# Patient Record
Sex: FEMALE | Race: WHITE
[De-identification: names, ages, dates, MRNs, and addresses within clinical notes are randomized per-mention and may not be internally consistent; named-entity substitution may affect disease eponyms.]

---

## 2020-12-08 NOTE — NUR
PT ARRIVED TO DAY SURGERY RM 9. PT CONFIRMED THAT SHE DID NOT DO THE PRE-OP
COVID SCREEN. PROCEDURE CANCELLED PER PROTOCOL AND
PT INSTRUCTED TO RESCHEDULE WITH DR. GOULD OFFICE.

## 2021-01-19 ENCOUNTER — HOSPITAL ENCOUNTER (OUTPATIENT)
Dept: HOSPITAL 46 - DS | Age: 76
Discharge: HOME | End: 2021-01-19
Attending: SURGERY
Payer: MEDICARE

## 2021-01-19 VITALS — HEIGHT: 63 IN | BODY MASS INDEX: 30.78 KG/M2 | WEIGHT: 173.72 LBS

## 2021-01-19 DIAGNOSIS — K22.9: ICD-10-CM

## 2021-01-19 DIAGNOSIS — K44.9: ICD-10-CM

## 2021-01-19 DIAGNOSIS — F41.8: ICD-10-CM

## 2021-01-19 DIAGNOSIS — K29.50: Primary | ICD-10-CM

## 2021-01-19 DIAGNOSIS — K58.0: ICD-10-CM

## 2021-01-19 DIAGNOSIS — K21.9: ICD-10-CM

## 2021-01-19 DIAGNOSIS — M25.552: ICD-10-CM

## 2021-01-19 DIAGNOSIS — M25.561: ICD-10-CM

## 2021-01-19 DIAGNOSIS — G25.81: ICD-10-CM

## 2021-01-19 DIAGNOSIS — Z83.79: ICD-10-CM

## 2021-01-19 DIAGNOSIS — Z98.890: ICD-10-CM

## 2021-01-19 PROCEDURE — 0DB68ZX EXCISION OF STOMACH, VIA NATURAL OR ARTIFICIAL OPENING ENDOSCOPIC, DIAGNOSTIC: ICD-10-PCS | Performed by: SURGERY

## 2021-01-19 PROCEDURE — G0500 MOD SEDAT ENDO SERVICE >5YRS: HCPCS

## 2021-01-19 PROCEDURE — 0DB98ZX EXCISION OF DUODENUM, VIA NATURAL OR ARTIFICIAL OPENING ENDOSCOPIC, DIAGNOSTIC: ICD-10-PCS | Performed by: SURGERY

## 2021-01-19 NOTE — OR
[facility]
                                    [address], [place]  [address]
_________________________________________________________________________________________
                                                                 Signed   
 
 
DATE OF OPERATION:
01/19/2021
 
SURGEON:
Hudson Garcia MD
 
PREOPERATIVE DIAGNOSES:
1. Left upper quadrant abdominal pain.
2. Irritable bowel syndrome with diarrhea.
3. Laparoscopic Nissen fundoplication in 2015.
4. Recurrent hiatal hernia.
5. History of Crohn disease in her son.
 
POSTOPERATIVE DIAGNOSES:
1. Nissen fundoplication (40-38 cm).
2. Mild patchy gastritis.
3. Patulous esophagus.
 
PROCEDURES:
EGD with CLOtest and biopsies of the duodenum, pyloric bulb and antrum.
 
ESTIMATED BLOOD LOSS:
None.
 
INDICATIONS:
Zaki is a 75-year-old female asked to see me for upper endoscopy.  She has had
irritable bowel syndrome with diarrhea for many years.  She had a lap Nissen in 2015
while living over in [place], [place].  Recently they moved to the [facility] to be
closer to family.  She has been having left upper quadrant abdominal pain.  She said the
acid reflux is markedly improved.  A CT scan of the abdomen and pelvis for microscopic
hematuria showed a recurrence of her hiatal hernia.  Consequently, she was asked to see
me with respect to the above.  I had explained that it is quite common that part of the
fundoplication herniates.  As long as it is functioning and asymptomatic, it is best to
left alone.  We also reviewed upper endoscopy in detail.  She understands the nature of
the test along with its risks including, but not limited to gas bloating, crampy
abdominal pain, bleeding, perforation requiring surgery, and missed diagnosis.  She also
recalls the need for IV conscious sedation.  She had expressed understanding and wished
to proceed.  We also sent her for a repeat barium swallow.  She has a __________ with an
obvious patulous esophagus in her Nissen fundoplication.  She does have some recurrence
to the hiatal hernia.  Zaki had expressed understanding and wished to proceed with
her upper endoscopy. 
 
 
    Electronically Signed By: HUDSON GARCIA MD  01/19/21 0958
_________________________________________________________________________________________
PATIENT NAME:     ZAKI STAUFFER                   
MEDICAL RECORD #: [id number]            OPERATIVE REPORT              
          ACCT #: [id number]  
DATE OF BIRTH:   [birth year]            REPORT #: [id number]      
PHYSICIAN:        HUDSON GARCIA MD             
PCP:              SONYA LAGUNA MD           
REPORT IS CONFIDENTIAL AND NOT TO BE RELEASED WITHOUT AUTHORIZATION
 
 
                                  [facility]
                                    [address] [place] [address]  [address]
_________________________________________________________________________________________
                                                                 Signed   
 
 
PROCEDURE NOTE:
Zaki was taken into our endoscopy suite and placed in the supine semi-recumbent
position.  She was given IV sedation with 2 mg of Versed and 50 mcg of fentanyl.  She is
bradycardic in the 40s and so her circulation time was a bit slow.  The posterior
oropharynx was anesthetized with Hurricaine spray.  A bite block was utilized for the
case.  The adult gastroscope had been introduced and advanced under direct visualization
of camera.  We could see she had a patulous esophagus with saliva in her distal
esophagus.  We went through the fundoplication all the way out into the third portion of
the duodenum.  We took a biopsy of the duodenum because of history of diarrhea.
However, the duodenum and pyloric channel were unremarkable.  The stomach showed some
mild patchy erythematous changes throughout.  We went and took a biopsy of the antrum
for CLOtest as well as pathologic review.  Upon retroflexion of scope, we can easily see
her intact fundoplication.  The scope was withdrawn up through the area of the
fundoplication.  It measured out from 40 cm back to about 37 cm.  The GE junction is
unremarkable.  There is no disruption to the Z-line.  No Yin's mucosa, no distal
esophagitis.  Again, she has a patulous esophagus with pooling of saliva.  However, no
irritation.  The middle and upper esophagus were unremarkable.  No obvious areas of
narrowing with the scope.  The vocal cords and arytenoids were unremarkable.  The gas
had been suctioned out and the gastroscope removed.  Zaki tolerated the procedure
quite well. 
 
RECOMMENDATIONS:
I will see Zaki back in my office in 7 to 14 days to review her barium swallow in the
upper endoscopy. 
 
 
 
            ________________________________________
            Hudson Garcia MD 
 
 
ALB/MODL
Job #:  [id number]
DD:  01/19/2021 08:39:13
DT:  01/19/2021 08:54:19
 
cc:            MD Hudson Awad MD
 
 
Copies:  SONYA LAGUNA DMD
 
    Electronically Signed By: HUDSON GARCIA MD  01/19/21 0958
_________________________________________________________________________________________
PATIENT NAME:     ZAKI STAUFFER                   
MEDICAL RECORD #: [id number]            OPERATIVE REPORT              
          ACCT #: [id number]  
DATE OF BIRTH:   [birth year]            REPORT #: [id number]      
PHYSICIAN:        HUDSON GARCIA MD             
PCP:              SONYA LAGUNA MD           
REPORT IS CONFIDENTIAL AND NOT TO BE RELEASED WITHOUT AUTHORIZATION
 
 
                                  [address]  [address]
_________________________________________________________________________________________
                                                                 Signed   
 
 
         HUDSON GARCIA MD
~
 
 
 
 
 
 
 
 
 
 
 
 
 
 
 
 
 
 
 
 
 
 
 
 
 
 
 
 
 
 
 
 
 
 
 
 
 
 
 
 
 
    Electronically Signed By: HUDSON GARCIA MD  01/19/21 0958
_________________________________________________________________________________________
PATIENT NAME:     ZAKI STAUFFER                   
MEDICAL RECORD #: [id number]            OPERATIVE REPORT              
          ACCT #: [id number]  
DATE OF BIRTH:   [birth year]            REPORT #: [id number]      
PHYSICIAN:        HUDSON GARCIA MD             
PCP:              SONYA LAGUNA MD           
REPORT IS CONFIDENTIAL AND NOT TO BE RELEASED WITHOUT AUTHORIZATION

## 2021-01-20 NOTE — PATH
[facility]
                                    [address] [place], [place]  [address]
_________________________________________________________________________________________
                                                                 Signed   
 
 
 
SPECIMEN(S): A DUODENAL BIOPSY
SPECIMEN(S): B ANTRUM/PYLORUS BIOPSY
 
SPECIMEN SOURCE:
A. DUODENAL BIOPSY
B. ANTRUM/PYLORUS BIOPSY
 
CLINICAL HISTORY:
Left upper quadrant pain.  History of hiatal hernia.  History of diarrhea.   
Gastritis. 
MICROSCOPIC DESCRIPTION:
Histologic sections of all submitted blocks are examined by light microscopy. 
These findings, together with the gross examination, support the pathologic 
diagnosis. 
B1 - An immunostain for Helicobacter pylori is performed with appropriate 
controls to evaluate for the presence of microorganisms.  The stain is negative 
for organisms. 
 
FINAL PATHOLOGIC DIAGNOSIS:
A.  Duodenal biopsy:
-  Benign duodenal mucosa, negative for specific diagnostic abnormality.
B.  Antrum/pylorus, biopsy:
-  Mild chronic gastritis.
-  Helicobacter pylori immunostain is negative for organisms.
JVR:cml:C2NR
 
GROSS DESCRIPTION:
Two specimens are received in two containers, labeled "NO."
A.  The specimen, labeled "NO, duodenum biopsy," is received in formalin and 
consists of one tan soft tissue fragment that measures 0.3 cm in greatest 
dimension. The specimen is entirely submitted in 
cassette (A1).
B.  The specimen, labeled "NO, antrum biopsy," is received in formalin and 
consists of one tan soft tissue fragment that measures 0.3 cm in greatest 
dimension. The specimen is entirely submitted in 
cassette (B1).
JS (under the direct supervision of a pathologist)
The Gross Description was prepared using a voice recognition system. The report 
was reviewed for accuracy; however, sound-alike word errors, addition and/or 
deletions may occur. If there is any 
question about this report, please contact Client Services.
 
                                                                                    
_________________________________________________________________________________________
PATIENT NAME:     ZAKI STAUFFER                   
MEDICAL RECORD #: [id number]            PATHOLOGY                     
          ACCT #: [id number]       ACCESSION #: [id number]     
DATE OF BIRTH:   [birth year]            REPORT #: [id number]       
PHYSICIAN:        [facility] PATHOLOGY              
PCP:              SONYA LAGUNA MD           
REPORT IS CONFIDENTIAL AND NOT TO BE RELEASED WITHOUT AUTHORIZATION
 
 
                                  [facility]
                                    [address] [place], [place]  [address]
_________________________________________________________________________________________
                                                                 Signed   
 
 
 
PERFORMING LABORATORY:
The technical component was performed by [facility], [address] [place] [address] (Medical Director: Yessi Sinclair MD; CLIA# [id number]). 
Professional interpretation [place] [address]. 
 
Diagnostician:  Rusty Samaniego MD
Pathologist
Electronically Signed 01/20/2021
 
 
Copies:                                
~
 
 
 
 
 
 
 
 
 
 
 
 
 
 
 
 
 
 
 
 
 
 
 
 
 
 
 
 
                                                                                    
_________________________________________________________________________________________
PATIENT NAME:     ZAKI STAUFFER                   
MEDICAL RECORD #: [id number]            PATHOLOGY                     
          ACCT #: [id number]       ACCESSION #: [id number]     
DATE OF BIRTH:   [birth year]            REPORT #: [id number]       
PHYSICIAN:        [facility]              
PCP:              SONYA LAGUNA MD           
REPORT IS CONFIDENTIAL AND NOT TO BE RELEASED WITHOUT AUTHORIZATION

## 2021-11-26 ENCOUNTER — HOSPITAL ENCOUNTER (EMERGENCY)
Dept: HOSPITAL 46 - ED | Age: 76
Discharge: HOME | End: 2021-11-26
Payer: MEDICARE

## 2021-11-26 VITALS — HEIGHT: 63 IN | WEIGHT: 173 LBS | BODY MASS INDEX: 30.65 KG/M2

## 2021-11-26 DIAGNOSIS — Z87.891: ICD-10-CM

## 2021-11-26 DIAGNOSIS — R07.89: Primary | ICD-10-CM

## 2021-11-26 DIAGNOSIS — Z88.0: ICD-10-CM

## 2021-11-26 DIAGNOSIS — Z79.899: ICD-10-CM

## 2021-11-27 NOTE — EKG
[facility] [place] [facility]
                                    [address] [facility]
                                  [place], [place]  [address]
_________________________________________________________________________________________
                                                                 Signed   
 
 
Sinus bradycardia
Otherwise normal ECG
No previous ECGs available
Confirmed by RUSS FERRERA DO ([id number]) on 11/27/2021 6:51:35 PM
 
 
 
 
 
 
 
 
 
 
 
 
 
 
 
 
 
 
 
 
 
 
 
 
 
 
 
 
 
 
 
 
 
 
 
 
 
 
 
 
 
    Electronically Signed By: RUSS FERRERA DO  11/27/21 1851
_________________________________________________________________________________________
PATIENT NAME:     ZAKI STAUFFER                   
MEDICAL RECORD #: [id number]                     Electrocardiogram             
          ACCT #: [id number]  
DATE OF BIRTH:   [birth year]                                       
PHYSICIAN:   RUSS FERRERA DO                     REPORT #: [id number]
REPORT IS CONFIDENTIAL AND NOT TO BE RELEASED WITHOUT AUTHORIZATION

## 2022-10-17 ENCOUNTER — HOSPITAL ENCOUNTER (EMERGENCY)
Dept: HOSPITAL 46 - ED | Age: 77
Discharge: HOME | End: 2022-10-17
Payer: MEDICARE

## 2022-10-17 VITALS — BODY MASS INDEX: 31.71 KG/M2 | HEIGHT: 63 IN | WEIGHT: 178.99 LBS

## 2022-10-17 DIAGNOSIS — Z87.891: ICD-10-CM

## 2022-10-17 DIAGNOSIS — K52.9: Primary | ICD-10-CM

## 2022-10-17 DIAGNOSIS — Z79.899: ICD-10-CM

## 2022-10-17 DIAGNOSIS — Z88.0: ICD-10-CM

## 2023-07-20 VITALS — DIASTOLIC BLOOD PRESSURE: 77 MMHG | SYSTOLIC BLOOD PRESSURE: 139 MMHG

## 2023-07-31 ENCOUNTER — HOSPITAL ENCOUNTER (OUTPATIENT)
Dept: HOSPITAL 46 - DS | Age: 78
LOS: 2 days | Discharge: HOME | End: 2023-08-02
Attending: SPECIALIST
Payer: MEDICARE

## 2023-07-31 VITALS — WEIGHT: 175.36 LBS | BODY MASS INDEX: 31.07 KG/M2 | HEIGHT: 63 IN

## 2023-07-31 VITALS — DIASTOLIC BLOOD PRESSURE: 55 MMHG | SYSTOLIC BLOOD PRESSURE: 108 MMHG

## 2023-07-31 VITALS — DIASTOLIC BLOOD PRESSURE: 61 MMHG | SYSTOLIC BLOOD PRESSURE: 91 MMHG

## 2023-07-31 VITALS — DIASTOLIC BLOOD PRESSURE: 51 MMHG | SYSTOLIC BLOOD PRESSURE: 90 MMHG

## 2023-07-31 VITALS — SYSTOLIC BLOOD PRESSURE: 98 MMHG | DIASTOLIC BLOOD PRESSURE: 56 MMHG

## 2023-07-31 VITALS — SYSTOLIC BLOOD PRESSURE: 105 MMHG | DIASTOLIC BLOOD PRESSURE: 62 MMHG

## 2023-07-31 VITALS — SYSTOLIC BLOOD PRESSURE: 100 MMHG | DIASTOLIC BLOOD PRESSURE: 55 MMHG

## 2023-07-31 VITALS — SYSTOLIC BLOOD PRESSURE: 92 MMHG | DIASTOLIC BLOOD PRESSURE: 50 MMHG

## 2023-07-31 VITALS — SYSTOLIC BLOOD PRESSURE: 128 MMHG | DIASTOLIC BLOOD PRESSURE: 66 MMHG

## 2023-07-31 VITALS — SYSTOLIC BLOOD PRESSURE: 91 MMHG | DIASTOLIC BLOOD PRESSURE: 59 MMHG

## 2023-07-31 VITALS — SYSTOLIC BLOOD PRESSURE: 108 MMHG | DIASTOLIC BLOOD PRESSURE: 58 MMHG

## 2023-07-31 DIAGNOSIS — Z88.0: ICD-10-CM

## 2023-07-31 DIAGNOSIS — M16.12: Primary | ICD-10-CM

## 2023-07-31 PROCEDURE — C1776 JOINT DEVICE (IMPLANTABLE): HCPCS

## 2023-07-31 PROCEDURE — A9270 NON-COVERED ITEM OR SERVICE: HCPCS

## 2023-07-31 PROCEDURE — 0SRB0JZ REPLACEMENT OF LEFT HIP JOINT WITH SYNTHETIC SUBSTITUTE, OPEN APPROACH: ICD-10-PCS | Performed by: SPECIALIST

## 2023-07-31 PROCEDURE — C1713 ANCHOR/SCREW BN/BN,TIS/BN: HCPCS

## 2023-07-31 NOTE — OR
[facility]
                                    [address] [facility], [place]  [address]
_________________________________________________________________________________________
                                                                 Signed   
 
 
DATE OF OPERATION:
07/31/2023
 
SURGEON:
Barb Aviles MD
 
PREOPERATIVE DIAGNOSIS:
Left hip DJD, severe.
 
POSTOPERATIVE DIAGNOSIS:
Left hip DJD, severe.
 
PROCEDURE PERFORMED:
Left total hip arthroplasty with Jean Claude.
 
ASSISTANT:
Lanny Osorio PA-C.  Lanny was present and critical for all portions of
procedure. 
 
ANESTHESIA:
Spinal.
 
BLOOD LOSS:
175 mL.
 
IMPLANTS:
Larissa size 2 high offset stem, 46 mm cup with 2 screws and +0 head.
 
BRIEF HISTORY:
Zaki is an 78-year-old female with progressive worsening of severe osteoarthritis in
the left hip.  Risks and benefits of operative treatment were discussed with her and she
elected to proceed. 
 
DESCRIPTION OF OPERATION:
Once consent was obtained, she was taken to the operating room. After adequate
anesthesia, she was placed in the operating bed on the right lateral decubitus position.
Axillary roll was placed and the leg was prepped and draped in a standard sterile
fashion.  All downside pressure points were well padded.  The pins for the computer
array were then placed in the iliac crest three fingerbreadths posterior to the ASIS.
The hip was then approached through standard anterolateral approach with a longitudinal
skin incision taken through the skin and subcutaneous tissue.  The IT band was divided
longitudinally just posterior to its anterior edge.  The vastus lateralis was then
 
    Electronically Signed By: BARB AVILES MD  07/31/23 1339
_________________________________________________________________________________________
PATIENT NAME:     ZAKI STAUFFER                   
MEDICAL RECORD #: [id number]            OPERATIVE REPORT              
          ACCT #: [id number]  
DATE OF BIRTH:   [birth year]            REPORT #: [id number]      
PHYSICIAN:        BARB AVILES MD              
PCP:              SONYA LAGUNA MD           
REPORT IS CONFIDENTIAL AND NOT TO BE RELEASED WITHOUT AUTHORIZATION
 
 
                                  [facility]
                                    [address] [place], [place]  [address]
_________________________________________________________________________________________
                                                                 Signed   
 
 
divided from the tip of the trochanter distally and subperiosteally elevated.  The
gluteus medius was divided just along its anterior margin from the tip of the trochanter
to the acetabular rim.  This was taken down to the capsule and femoral neck, which was
then elevated off the anterior femoral neck all the way around as far as we could reach.
 The capsule was released and a posterior partial capsulectomy was performed.  The
posterior superior osteophyte off the acetabulum was removed to allow better chance of
dislocation.  Once this was accomplished, the leg was registered with the computer and
the hip was dislocated.  The femoral neck cut was made one fingerbreadth above the
lesser trochanter and the head was passed off the table.  The patient wanted to save it.
 Once this was completed, the periacetabular soft tissue was removed and the remnants of
the bone are removed.  The fine anatomic points of the acetabulum were registered with
the computer.  Once this was accomplished, the robot was brought in.  The acetabulum was
reamed with a 44 first followed by 46.  No cysts were identified.  The osteophytes were
trimmed back posteriorly.  The cup was then impacted in 23 degrees of anteversion and 44
degrees of abduction.  Once this was completed, the cup was secured with two 6.5 screws
posterior superior.  The acetabular liner was then impacted until it was well seated and
locked.  Attention was then turned to the proximal femur which was opened using the
cookie cutter followed by the Maryam awl.  We then sequentially broached up to a 2,
which was found to be well fitting and left in position.  The high offset neck and a +0
head was positioned.  The hip was reduced.  The leg lengths were found to be equal with
excellent range of motion and a negative Shuck test.  The hip was dislocated and the
trials were removed.  The final stem was impacted until it was well seated and flushed
with the cup.  The +0  32 mm head was impacted and the hip was reduced and again leg
lengths found to be good.  The hip was stable throughout the range of motion.  We then
copiously irrigated the entire wound with Surgiphor followed by normal saline.  The
capsulotomy was then closed using #1 Vicryl. The vastus and IT bands were closed
independently using #2 Stratafix, the subcutaneous tissue with 0 Stratafix and the skin
with 3-0 Stratafix.  LiquiBand was applied and the wound was dressed with an Aquacel Ag
dressing.  The patient was then awakened, taken to the recovery room in satisfactory
condition.  All sponge, needle, and instrument counts were correct. 
 
 
 
            ________________________________________
            Barb Aviles MD 
 
 
BA/MODL
Job #:  [id number]
DD:  07/31/2023 09:10:41
DT:  07/31/2023 10:37:19
 
 
    Electronically Signed By: BARB AVILES MD  07/31/23 1339
_________________________________________________________________________________________
PATIENT NAME:     ZAKI STAUFFER                   
MEDICAL RECORD #: [id number]            OPERATIVE REPORT              
          ACCT #: [id number]  
DATE OF BIRTH:   [birth year]            REPORT #: [id number]      
PHYSICIAN:        BARB AVILES MD              
PCP:              SONYA LAGUNA MD           
REPORT IS CONFIDENTIAL AND NOT TO BE RELEASED WITHOUT AUTHORIZATION
 
 
                                  [address]  [address]
_________________________________________________________________________________________
                                                                 Signed   
 
 
Copies:                                
~
 
 
 
 
 
 
 
 
 
 
 
 
 
 
 
 
 
 
 
 
 
 
 
 
 
 
 
 
 
 
 
 
 
 
 
 
 
 
 
 
 
    Electronically Signed By: BARB AVILES MD  07/31/23 1339
_________________________________________________________________________________________
PATIENT NAME:     ZAKI STAUFFER                   
MEDICAL RECORD #: [id number]            OPERATIVE REPORT              
          ACCT #: [id number]  
DATE OF BIRTH:   [birth year]            REPORT #: [id number]      
PHYSICIAN:        BARB AVILES MD              
PCP:              SONYA LAGUNA MD           
REPORT IS CONFIDENTIAL AND NOT TO BE RELEASED WITHOUT AUTHORIZATION

## 2023-08-01 VITALS — SYSTOLIC BLOOD PRESSURE: 102 MMHG | DIASTOLIC BLOOD PRESSURE: 58 MMHG

## 2023-08-01 VITALS — SYSTOLIC BLOOD PRESSURE: 80 MMHG | DIASTOLIC BLOOD PRESSURE: 52 MMHG

## 2023-08-01 VITALS — DIASTOLIC BLOOD PRESSURE: 84 MMHG | SYSTOLIC BLOOD PRESSURE: 109 MMHG

## 2023-08-01 VITALS — SYSTOLIC BLOOD PRESSURE: 90 MMHG | DIASTOLIC BLOOD PRESSURE: 58 MMHG

## 2023-08-01 VITALS — DIASTOLIC BLOOD PRESSURE: 61 MMHG | SYSTOLIC BLOOD PRESSURE: 120 MMHG

## 2023-08-01 VITALS — SYSTOLIC BLOOD PRESSURE: 88 MMHG | DIASTOLIC BLOOD PRESSURE: 60 MMHG

## 2023-08-01 VITALS — SYSTOLIC BLOOD PRESSURE: 92 MMHG | DIASTOLIC BLOOD PRESSURE: 48 MMHG

## 2023-08-02 VITALS — DIASTOLIC BLOOD PRESSURE: 56 MMHG | SYSTOLIC BLOOD PRESSURE: 121 MMHG

## 2023-08-02 VITALS — DIASTOLIC BLOOD PRESSURE: 57 MMHG | SYSTOLIC BLOOD PRESSURE: 113 MMHG

## 2023-08-07 ENCOUNTER — HOSPITAL ENCOUNTER (INPATIENT)
Dept: HOSPITAL 46 - ED | Age: 78
LOS: 4 days | Discharge: NURSING, REHAB OR LONG-TERM CARE | DRG: 556 | End: 2023-08-11
Admitting: SPECIALIST
Payer: MEDICARE

## 2023-08-07 DIAGNOSIS — Z79.82: ICD-10-CM

## 2023-08-07 DIAGNOSIS — Z79.899: ICD-10-CM

## 2023-08-07 DIAGNOSIS — K59.00: ICD-10-CM

## 2023-08-07 DIAGNOSIS — Z90.49: ICD-10-CM

## 2023-08-07 DIAGNOSIS — R33.9: ICD-10-CM

## 2023-08-07 DIAGNOSIS — Z96.642: ICD-10-CM

## 2023-08-07 DIAGNOSIS — Z87.891: ICD-10-CM

## 2023-08-07 DIAGNOSIS — K52.9: ICD-10-CM

## 2023-08-07 DIAGNOSIS — I25.10: ICD-10-CM

## 2023-08-07 DIAGNOSIS — G25.81: ICD-10-CM

## 2023-08-07 DIAGNOSIS — Z90.710: ICD-10-CM

## 2023-08-07 DIAGNOSIS — Z88.0: ICD-10-CM

## 2023-08-07 DIAGNOSIS — Z79.891: ICD-10-CM

## 2023-08-07 DIAGNOSIS — M25.552: Primary | ICD-10-CM

## 2023-08-08 PROCEDURE — 0T9B70Z DRAINAGE OF BLADDER WITH DRAINAGE DEVICE, VIA NATURAL OR ARTIFICIAL OPENING: ICD-10-PCS | Performed by: SPECIALIST
